# Patient Record
Sex: MALE | Race: OTHER | HISPANIC OR LATINO | URBAN - METROPOLITAN AREA
[De-identification: names, ages, dates, MRNs, and addresses within clinical notes are randomized per-mention and may not be internally consistent; named-entity substitution may affect disease eponyms.]

---

## 2021-03-09 ENCOUNTER — EMERGENCY (EMERGENCY)
Facility: HOSPITAL | Age: 56
LOS: 1 days | Discharge: HOME | End: 2021-03-09
Attending: EMERGENCY MEDICINE
Payer: MEDICAID

## 2021-03-09 VITALS
RESPIRATION RATE: 18 BRPM | TEMPERATURE: 98 F | DIASTOLIC BLOOD PRESSURE: 89 MMHG | HEART RATE: 70 BPM | SYSTOLIC BLOOD PRESSURE: 163 MMHG | OXYGEN SATURATION: 100 %

## 2021-03-09 VITALS
HEART RATE: 76 BPM | DIASTOLIC BLOOD PRESSURE: 115 MMHG | SYSTOLIC BLOOD PRESSURE: 183 MMHG | RESPIRATION RATE: 18 BRPM | TEMPERATURE: 98 F | WEIGHT: 179.9 LBS | HEIGHT: 65 IN | OXYGEN SATURATION: 99 %

## 2021-03-09 LAB
ALBUMIN SERPL ELPH-MCNC: 3.7 G/DL — SIGNIFICANT CHANGE UP (ref 3.5–5)
ALP SERPL-CCNC: 80 U/L — SIGNIFICANT CHANGE UP (ref 40–120)
ALT FLD-CCNC: 64 U/L DA — HIGH (ref 10–60)
ANION GAP SERPL CALC-SCNC: 2 MMOL/L — LOW (ref 5–17)
APTT BLD: 29.7 SEC — SIGNIFICANT CHANGE UP (ref 27.5–35.5)
AST SERPL-CCNC: 34 U/L — SIGNIFICANT CHANGE UP (ref 10–40)
BILIRUB SERPL-MCNC: 0.4 MG/DL — SIGNIFICANT CHANGE UP (ref 0.2–1.2)
BUN SERPL-MCNC: 14 MG/DL — SIGNIFICANT CHANGE UP (ref 7–18)
CALCIUM SERPL-MCNC: 9 MG/DL — SIGNIFICANT CHANGE UP (ref 8.4–10.5)
CHLORIDE SERPL-SCNC: 105 MMOL/L — SIGNIFICANT CHANGE UP (ref 96–108)
CO2 SERPL-SCNC: 30 MMOL/L — SIGNIFICANT CHANGE UP (ref 22–31)
CREAT SERPL-MCNC: 1.1 MG/DL — SIGNIFICANT CHANGE UP (ref 0.5–1.3)
GLUCOSE SERPL-MCNC: 312 MG/DL — HIGH (ref 70–99)
HCT VFR BLD CALC: 45.3 % — SIGNIFICANT CHANGE UP (ref 39–50)
HGB BLD-MCNC: 15.5 G/DL — SIGNIFICANT CHANGE UP (ref 13–17)
INR BLD: 1.06 RATIO — SIGNIFICANT CHANGE UP (ref 0.88–1.16)
MCHC RBC-ENTMCNC: 29.6 PG — SIGNIFICANT CHANGE UP (ref 27–34)
MCHC RBC-ENTMCNC: 34.2 GM/DL — SIGNIFICANT CHANGE UP (ref 32–36)
MCV RBC AUTO: 86.5 FL — SIGNIFICANT CHANGE UP (ref 80–100)
NRBC # BLD: 0 /100 WBCS — SIGNIFICANT CHANGE UP (ref 0–0)
PLATELET # BLD AUTO: 190 K/UL — SIGNIFICANT CHANGE UP (ref 150–400)
POTASSIUM SERPL-MCNC: 5 MMOL/L — SIGNIFICANT CHANGE UP (ref 3.5–5.3)
POTASSIUM SERPL-SCNC: 5 MMOL/L — SIGNIFICANT CHANGE UP (ref 3.5–5.3)
PROT SERPL-MCNC: 7.7 G/DL — SIGNIFICANT CHANGE UP (ref 6–8.3)
PROTHROM AB SERPL-ACNC: 12.6 SEC — SIGNIFICANT CHANGE UP (ref 10.6–13.6)
RBC # BLD: 5.24 M/UL — SIGNIFICANT CHANGE UP (ref 4.2–5.8)
RBC # FLD: 12.6 % — SIGNIFICANT CHANGE UP (ref 10.3–14.5)
SARS-COV-2 RNA SPEC QL NAA+PROBE: SIGNIFICANT CHANGE UP
SODIUM SERPL-SCNC: 137 MMOL/L — SIGNIFICANT CHANGE UP (ref 135–145)
TROPONIN I SERPL-MCNC: <0.015 NG/ML — SIGNIFICANT CHANGE UP (ref 0–0.04)
WBC # BLD: 5.05 K/UL — SIGNIFICANT CHANGE UP (ref 3.8–10.5)
WBC # FLD AUTO: 5.05 K/UL — SIGNIFICANT CHANGE UP (ref 3.8–10.5)

## 2021-03-09 PROCEDURE — 87635 SARS-COV-2 COVID-19 AMP PRB: CPT

## 2021-03-09 PROCEDURE — 93005 ELECTROCARDIOGRAM TRACING: CPT

## 2021-03-09 PROCEDURE — 86901 BLOOD TYPING SEROLOGIC RH(D): CPT

## 2021-03-09 PROCEDURE — 99285 EMERGENCY DEPT VISIT HI MDM: CPT

## 2021-03-09 PROCEDURE — 85730 THROMBOPLASTIN TIME PARTIAL: CPT

## 2021-03-09 PROCEDURE — 84484 ASSAY OF TROPONIN QUANT: CPT

## 2021-03-09 PROCEDURE — 86900 BLOOD TYPING SEROLOGIC ABO: CPT

## 2021-03-09 PROCEDURE — 80053 COMPREHEN METABOLIC PANEL: CPT

## 2021-03-09 PROCEDURE — 85027 COMPLETE CBC AUTOMATED: CPT

## 2021-03-09 PROCEDURE — 85610 PROTHROMBIN TIME: CPT

## 2021-03-09 PROCEDURE — U0005: CPT

## 2021-03-09 PROCEDURE — 99283 EMERGENCY DEPT VISIT LOW MDM: CPT | Mod: 25

## 2021-03-09 PROCEDURE — 86850 RBC ANTIBODY SCREEN: CPT

## 2021-03-09 PROCEDURE — 71046 X-RAY EXAM CHEST 2 VIEWS: CPT | Mod: 26

## 2021-03-09 PROCEDURE — 36415 COLL VENOUS BLD VENIPUNCTURE: CPT

## 2021-03-09 PROCEDURE — 71046 X-RAY EXAM CHEST 2 VIEWS: CPT

## 2021-03-09 NOTE — ED PROVIDER NOTE - PHYSICAL EXAMINATION
GENERAL: well appearing, no acute distress   HEAD: atraumatic   EYES: EOMI, pink conjunctiva   ENT: moist oral mucosa   CARDIAC: RRR, no edema, distal pulses present, R chest wall ttp w/o crepitus   RESPIRATORY: lungs CTAB, no increased work of breathing   GASTROINTESTINAL: no abdominal tenderness, no rebound or guarding, bowel sounds presents  GENITOURINARY: no CVA tenderness   MUSCULOSKELETAL: no deformity   NEUROLOGICAL: AAOx3, spontaneous movement of extremities   SKIN: intact   PSYCHIATRIC: cooperative  HEME LYMPH: no lymphadenopathy

## 2021-03-09 NOTE — ED ADULT NURSE NOTE - OBJECTIVE STATEMENT
Patient present to ED with c/o right sided chest pain and right shoulder pain with movement. S/P fell in snow 3 weeks ago pain level 8/10

## 2021-03-09 NOTE — ED PROVIDER NOTE - OBJECTIVE STATEMENT
54 yo M pmh of DM presents with CP x a few days, mostly R sided, reproducible on exam. Fell 2 weeks ago in the snow. Denies fever, cough, SOB, back pain, other acute complaints. [place] language line used.

## 2021-03-09 NOTE — ED PROVIDER NOTE - PATIENT PORTAL LINK FT
You can access the FollowMyHealth Patient Portal offered by [facility] by registering at the following website: [email or web address]. By joining [employer]’s FollowMyHealth portal, you will also be able to view your health information using other applications (apps) compatible with our system.

## 2021-03-09 NOTE — ED ADULT NURSE NOTE - NS ED NURSE RECORD ANOTHER VITAL SIGN
Pt called in about letter needing to be sent to Dr. Candace Carmona for him to continue to be seen in pain management at that office. Pt states that the letter needs to state why he received medication,what the medication was and when he was prescribed the meds. I saw a note in the chart and said that all of the information was faxed on 2/6/19. I spoke with Lynda today (5/9/19) and she faxed the referral again to Dr. Candace Carmona office.  I gave her the fax number provided by the patient : [phone] Yes

## 2021-03-09 NOTE — ED PROVIDER NOTE - NSFOLLOWUPINSTRUCTIONS_ED_ALL_ED_FT
Nonspecific Chest Pain    Chest pain can be caused by many different conditions. Some causes of chest pain can be life-threatening. These will require treatment right away. Serious causes of chest pain include:    Heart attack. A tear in the body's main blood vessel. Redness and swelling (inflammation) around your heart. Blood clot in your lungs.    Other causes of chest pain may not be so serious. These include:  Heartburn. Anxiety or stress. Damage to bones or muscles in your chest. Lung infections.    Chest pain can feel like:  Pain or discomfort in your chest. Crushing, pressure, aching, or squeezing pain. Burning or tingling. Dull or sharp pain that is worse when you move, cough, or take a deep breath. Pain or discomfort that is also felt in your back, neck, jaw, shoulder, or arm, or pain that spreads to any of these areas. It is hard to know whether your pain is caused by something that is serious or something that is not so serious. So it is important to see your doctor right away if you have chest pain.    Follow these instructions at home:    Medicines     Take over-the-counter and prescription medicines only as told by your doctor. If you were prescribed an antibiotic medicine, take it as told by your doctor. Do not stop taking the antibiotic even if you start to feel better.    Lifestyle      Rest as told by your doctor. Do not use any products that contain nicotine or tobacco, such as cigarettes, e-cigarettes, and chewing tobacco. If you need help quitting, ask your doctor. Do not drink alcohol. Make lifestyle changes as told by your doctor. These may include:  Getting regular exercise. Ask your doctor what activities are safe for you. Eating a heart-healthy diet. A diet and nutrition specialist (dietitian) can help you to learn healthy eating options. Staying at a healthy weight. Treating diabetes or high blood pressure, if needed. Lowering your stress. Activities such as yoga and relaxation techniques can help.    General instructions     Pay attention to any changes in your symptoms. Tell your doctor about them or any new symptoms. Avoid any activities that cause chest pain. Keep all follow-up visits as told by your doctor. This is important. You may need more testing if your chest pain does not go away.     Contact a doctor if:  Your chest pain does not go away. You feel depressed. You have a fever.     Get help right away if:  Your chest pain is worse. You have a cough that gets worse, or you cough up blood. You have very bad (severe) pain in your belly (abdomen). You pass out (faint).     You have either of these for no clear reason:  Sudden chest discomfort. Sudden discomfort in your arms, back, neck, or jaw. You have shortness of breath at any time. You suddenly start to sweat, or your skin gets clammy. You feel sick to your stomach (nauseous). You throw up (vomit). You suddenly feel lightheaded or dizzy. You feel very weak or tired. Your heart starts to beat fast, or it feels like it is skipping beats. These symptoms may be an emergency. Do not wait to see if the symptoms will go away. Get medical help right away. Call your local emergency services (911 in the [place] Do not drive yourself to the hospital.     Summary  Chest pain can be caused by many different conditions. The cause may be serious and need treatment right away. If you have chest pain, see your doctor right away. Follow your doctor's instructions for taking medicines and making lifestyle changes. Keep all follow-up visits as told by your doctor. This includes visits for any further testing if your chest pain does not go away. Be sure to know the signs that show that your condition has become worse. Get help right away if you have these symptoms. This information is not intended to replace advice given to you by your health care provider. Make sure you discuss any questions you have with your health care provider.    Document Released: 06/05/2009 Document Revised: 06/20/2019 Document Reviewed: 06/20/2019  [employer] Patient Education © 2019 [employer]
